# Patient Record
Sex: MALE | Race: AMERICAN INDIAN OR ALASKA NATIVE
[De-identification: names, ages, dates, MRNs, and addresses within clinical notes are randomized per-mention and may not be internally consistent; named-entity substitution may affect disease eponyms.]

---

## 2019-09-09 ENCOUNTER — HOSPITAL ENCOUNTER (EMERGENCY)
Dept: HOSPITAL 5 - ED | Age: 49
Discharge: HOME | End: 2019-09-09
Payer: COMMERCIAL

## 2019-09-09 VITALS — SYSTOLIC BLOOD PRESSURE: 167 MMHG | DIASTOLIC BLOOD PRESSURE: 102 MMHG

## 2019-09-09 DIAGNOSIS — M54.5: Primary | ICD-10-CM

## 2019-09-09 DIAGNOSIS — I10: ICD-10-CM

## 2019-09-09 PROCEDURE — 99282 EMERGENCY DEPT VISIT SF MDM: CPT

## 2019-09-09 PROCEDURE — 96372 THER/PROPH/DIAG INJ SC/IM: CPT

## 2019-09-09 NOTE — EMERGENCY DEPARTMENT REPORT
ED Back Pain/Injury HPI





- General


Chief Complaint: Back Pain/Injury


Stated Complaint: BACK PAIN


Time Seen by Provider: 09/09/19 09:52


Source: patient


Limitations: No Limitations





- History of Present Illness


Initial Comments: 





Patient is 49 years old male with history of herniated disc.  Patient presented 

to the ER complaining of lower back pain radiates down to his left leg for the 

last 3 days.  Patient denied any recent injury.  Patient also denied any fever 

or chills.  No weight loss.  No bowel or bladder incontinence.  Patient also 

denied any weakness numbness or tingling sensation.


MD Complaint: back pain


-: days(s) (5)





- Related Data


                                    Allergies











Allergy/AdvReac Type Severity Reaction Status Date / Time


 


No Known Allergies Allergy   Unverified 09/09/19 09:13














ED Review of Systems


ROS: 


Stated complaint: BACK PAIN


Other details as noted in HPI





Comment: All other systems reviewed and negative


Constitutional: denies: chills, fever


Respiratory: denies: cough, shortness of breath, SOB with exertion, wheezing


Cardiovascular: denies: chest pain, palpitations


Gastrointestinal: denies: abdominal pain


Musculoskeletal: back pain.  denies: joint swelling, arthralgia, myalgia


Neurological: denies: headache, weakness





ED Past Medical Hx





- Past Medical History


Previous Medical History?: Yes


Hx Hypertension: Yes





- Surgical History


Past Surgical History?: No





- Social History


Smoking Status: Never Smoker


Substance Use Type: None





ED Physical Exam





- General


Limitations: No Limitations


General appearance: alert, in no apparent distress





- Head


Head exam: Present: atraumatic, normocephalic, normal inspection





- Eye


Eye exam: Present: normal appearance





- ENT


ENT exam: Present: normal exam, normal orophraynx, mucous membranes moist





- Neck


Neck exam: Present: normal inspection, full ROM.  Absent: tenderness, 

meningismus, lymphadenopathy, thyromegaly





- Respiratory


Respiratory exam: Present: normal lung sounds bilaterally





- Cardiovascular


Cardiovascular Exam: Present: regular rate, normal rhythm, normal heart sounds





- GI/Abdominal


GI/Abdominal exam: Present: soft, normal bowel sounds.  Absent: distended, 

tenderness, guarding, rebound, rigid, hypoactive bowel sounds, mass, bruit, 

pulsatile mass, hernia





- Extremities Exam


Extremities exam: Present: normal inspection, full ROM, normal capillary refill.

 Absent: pedal edema, calf tenderness





- Back Exam


Back exam: Present: normal inspection, full ROM, muscle spasm.  Absent: CVA 

tenderness (R), CVA tenderness (L), paraspinal tenderness, vertebral tenderness





- Neurological Exam


Neurological exam: Present: alert, oriented X3, CN II-XII intact, normal gait, 

reflexes normal





- Psychiatric


Psychiatric exam: Present: normal mood





- Skin


Skin exam: Present: warm, intact, normal color





ED Course





                                   Vital Signs











  09/09/19





  09:15


 


Temperature 98.5 F


 


Pulse Rate 90


 


Respiratory 18





Rate 


 


Blood Pressure 167/102


 


O2 Sat by Pulse 98





Oximetry 











Critical care attestation.: 


If time is entered above; I have spent that time in minutes in the direct care 

of this critically ill patient, excluding procedure time.








ED Disposition


Clinical Impression: 


 Acute back pain





Disposition: DC-01 TO HOME OR SELFCARE


Is pt being admited?: No


Condition: Stable


Instructions:  Lumbar Radiculopathy (ED)


Referrals: 


PRIMARY CARE,MD [Referring] - 3-5 Days

## 2021-12-23 ENCOUNTER — OFFICE VISIT (OUTPATIENT)
Dept: URBAN - METROPOLITAN AREA CLINIC 118 | Facility: CLINIC | Age: 51
End: 2021-12-23
Payer: COMMERCIAL

## 2021-12-23 DIAGNOSIS — Z12.11 COLON CANCER SCREENING: ICD-10-CM

## 2021-12-23 PROCEDURE — 99202 OFFICE O/P NEW SF 15 MIN: CPT | Performed by: INTERNAL MEDICINE

## 2021-12-23 RX ORDER — NAPROXEN 500 MG/1
1 TABLET WITH FOOD OR MILK AS NEEDED TABLET ORAL
Status: ACTIVE | COMMUNITY

## 2021-12-23 RX ORDER — AMLODIPINE BESYLATE 5 MG/1
1 TABLET TABLET ORAL ONCE A DAY
Status: ACTIVE | COMMUNITY

## 2021-12-23 NOTE — HPI-TODAY'S VISIT:
The patient is referred by  for an initial colonoscopy/colon cancer screening.  He has no changes in bowel habits, rectal bleeding, abnormal loss of weight, or significant abdominal pain.  He denies a family history of colon cancer or colon polyps.  He has no active cardiopulmonary disease other than essential hypertension.

## 2021-12-28 ENCOUNTER — DASHBOARD ENCOUNTERS (OUTPATIENT)
Age: 51
End: 2021-12-28

## 2022-01-28 ENCOUNTER — OFFICE VISIT (OUTPATIENT)
Dept: URBAN - METROPOLITAN AREA SURGERY CENTER 23 | Facility: SURGERY CENTER | Age: 52
End: 2022-01-28
Payer: COMMERCIAL

## 2022-01-28 DIAGNOSIS — Z12.11 COLON CANCER SCREENING: ICD-10-CM

## 2022-01-28 PROCEDURE — G8907 PT DOC NO EVENTS ON DISCHARG: HCPCS | Performed by: INTERNAL MEDICINE

## 2022-01-28 PROCEDURE — 45378 DIAGNOSTIC COLONOSCOPY: CPT | Performed by: INTERNAL MEDICINE

## 2022-01-28 RX ORDER — AMLODIPINE BESYLATE 5 MG/1
1 TABLET TABLET ORAL ONCE A DAY
Status: ACTIVE | COMMUNITY

## 2022-01-28 RX ORDER — NAPROXEN 500 MG/1
1 TABLET WITH FOOD OR MILK AS NEEDED TABLET ORAL
Status: ACTIVE | COMMUNITY

## 2022-04-15 NOTE — SHORT STAY SUMMARY
Short Stay Documentation


Date of service: 04/15/22





- History


Principal diagnosis: umbilical hernia





- Allergies and Medications


Current Medications: 


                                    Allergies





No Known Allergies Allergy (Verified 04/04/22 16:47)


   





                                Home Medications











 Medication  Instructions  Recorded  Confirmed  Last Taken  Type


 


Naproxen [EC-Naproxen] 500 mg PO DAILY 04/04/22 04/04/22 Unknown History


 


amLODIPine [Norvasc] 5 mg PO DAILY 04/04/22 04/04/22 Unknown History








Active Medications





Cefazolin Sodium (Cefazolin/Sterile Water 2 Gm/20 Ml Syringe)  2 gm IV PREOP NR


   Stop: 04/15/22 23:59


Hydromorphone HCl (Hydromorphone 1 Mg/1 Ml Inj)  0.25 mg IV Q10MIN PRN


   PRN Reason: Pain, Moderate (4-6)


   Stop: 04/15/22 23:00


Hydromorphone HCl (Hydromorphone 1 Mg/1 Ml Inj)  0.5 mg IV Q10MIN PRN


   PRN Reason: Pain , Severe (7-10)


   Stop: 04/15/22 20:00


Lactated Ringer's (Lactated Ringers)  1,000 mls @ 125 mls/hr IV AS DIRECT GUTIERREZ


   Last Admin: 04/15/22 09:35 Dose:  125 mls/hr


   


Midazolam HCl (Midazolam 2 Mg/2 Ml Inj)  2 mg IV PREOP NR


   Stop: 04/15/22 23:59


   Last Admin: 04/15/22 11:00 Dose:  2 mg


   


Ondansetron HCl (Ondansetron 4 Mg/2 Ml Inj)  4 mg IV ONCE PRN


   PRN Reason: Nausea And Vomiting











- Brief post op/procedure progress note


Date of procedure: 04/15/22


Pre-op diagnosis: umbilical hernia


Post-op diagnosis: same


Procedure: 


robotic assisted umbilical hernia repair with mesh


Anesthesia: TISHA, other (TAP block)


Findings: 





2 cm umbilical hernia 


Repaired with 12cm x 10 cm bard flat mesh


Surgeon: RENETTA LOPEZ


Estimated blood loss: minimal


Pathology: none


Condition: stable





- Hospital course


Hospital course: 





Pt observed in PACU and discharged to home in stable condition.





- Disposition


Condition at discharge: Good





Short Stay Discharge Plan


Activity: other (No heavy lifting for 6 weeks)


Diet: regular


Wound: open to air, per your surgeon's advice


Additional Instructions: 


see printed instructions


Follow up with: 


GINO HAGEN MD [Primary Care Provider] - 7 Days


RENETTA LOPEZ DO [Staff Physician] - 14 Days


Prescriptions: 


Gabapentin 300 mg PO BID #6 cap


Ibuprofen [Motrin 800 MG tab] 800 mg PO Q8HR PRN #30 tablet


 PRN Reason: Pain, Moderate (4-6)


HYDROcodone/APAP 5-325 [[place] 5/325] 1 each PO Q6HR PRN #20 tablet


 PRN Reason: Pain , Severe (7-10)

## 2022-04-15 NOTE — ANESTHESIA CONSULTATION
Anesthesia Consult and Med Hx


Date of service: 04/15/22





- Airway


Anesthetic Teeth Evaluation: Good


ROM Head & Neck: Adequate


Mental/Hyoid Distance: Adequate


Mallampati Class: Class III


Intubation Access Assessment: Probably Good





- Pre-Operative Health Status


ASA Pre-Surgery Classification: ASA3


Proposed Anesthetic Plan: General





- Pulmonary


Hx Smoking: Yes (STOPPED X 20 YRS)


Hx Sleep Apnea: Yes (DX SLEEP APNEA , NO CPAP USE.)





- Cardiovascular System


Hx Hypertension: Yes (X 3 YRS)





- Central Nervous System


Hx Back Pain: Yes (WITH LEFT LEG PAIN)


Hx Psychiatric Problems: No





- Gastrointestinal


Hx Gastroesophageal Reflux Disease: No





- Hematic


Hx Anemia: No


Hx Sickle Cell Disease: No





- Other Systems


Hx Cancer: No

## 2022-04-15 NOTE — OPERATIVE REPORT
Operative Report


Operative Report: 


Date of procedure: 04/15/22


Pre-op diagnosis: umbilical hernia


Post-op diagnosis: same


Procedure: 


robotic assisted umbilical hernia repair with mesh


Anesthesia: GETA, other (TAP block)


Findings: 





2 cm umbilical hernia 


Repaired with 12cm x 10 cm bard flat mesh


Surgeon: RENETTA LOPEZ DO


Assistant surgeon: MD Justen


Estimated blood loss: minimal


Pathology: none


Condition: stable


Hospital course: 





Pt observed in PACU and discharged to home in stable condition.


Condition at discharge: Good





HPI and indication: 51-year-old male who presents to surgery clinic for 

evaluation of a bulge at his umbilicus.  This bulge has been present for some 

time and was reducible on exam.  He was symptomatic from the hernia.  It was 

recommended that the hernia be repaired.  All risk, benefits, alternatives 

surgery discussed with patient questions answered.  It was recommended that the 

hernia be repaired robotically.  Alternatives such as open versus laparoscopic 

repair were also discussed.  The patient was in agreement and consent obtained. 







Procedure in detail: The patient was identified in the preoperative area and 

taken back to the operating room and placed on the operating room table in 

supine position.  After anesthesia was induced, both arms were tucked with all 

bony prominences padded appropriately.  The abdomen was then prepped and draped 

in usual sterile fashion and a timeout was performed.  The patient had a TAP 

block performed by anesthesia preoperatively.  A nick incision was made in the 

left upper quadrant at Melton's point through which a Veress needle was 

inserted.  The Veress needle position was confirmed using the saline drop test 

and the abdomen insufflated to 15 mmHg without incident.  A 5 mm incision was 

made in the right upper quadrant through which a 5 mm Optiview trocar was placed

under direct visualization.  The abdomen was inspected and there was no 

underlying injury to any of the abdominal structures.  The Veress needle was 

identified and removed.  A 12 mm balloon trocar was placed in the right lateral 

abdomen and an 8 mm robotic trocar in the right lower quadrant under direct 

visualization.  The 5 mm right upper quadrant trocar was replaced with an 8 mm 

robotic trocar under direct visualization.  The patient was tilted to the left 

and the robot docked.  A fenestrated bipolar was placed in arm #2 and a 

monopolar scissor in arm #1.  The surgeon was then transferred to the console.





I createD a preperitoneal flap to the right of the hernia defect.  The 

peritoneum was scored to the right of the hernia defect using a monopolar 

scissor and a preperitoneal plane developed in an avascular plane.  Using a 

combination of blunt dissection and cautery the plane superior to and inferior 

to the hernia was developed.  There was preperitoneal fat incarcerated in the 

hernia which was carefully dissected and reduced.  I attempted to reduce the 

hernia sac however this was densely adhered to the skin.  In order to prevent 

injury to the skin the hernia sac was transected.  I then carried my 

preperitoneal dissection to the left aspect of the hernia defect in order to 

accommodate mesh placement.  Once the dissection was complete the pocket was 

checked for hemostasis.  The hernia defect was measured at 2cm and it was 

decided to fix the hernia with a 12cmx 10 cm Bard polypropylene mesh.  The mesh 

along with suture material placed into the abdomen by the assistant.  First the 

hernia defect was closed using a 0 VLoc running stitch.  The pressure in the 

abdomen was turned down to 8 mmHg.  The mesh was then placed in the 

preperitoneal space and centered.  The mesh was sutured into place in all 4 

quadrants using interrupted 2-0 Vicryl stitches.  The mesh laid flat in the 

preperitoneal space with adequate overlap of the hernia. The peritoneum was 

approximated using 3 0 VLoc running stitch.  A defect at the center of the 

peritoneum was approximated using a 3 0 V-Loc running stitch.  The robot was 

then undocked and the surgeon scrubbed back in.  The remainder of the case was 

performed laparoscopically.





All sharp and suture material was removed under direct visualization.  The 12 mm

port was removed and the fascia closed with an interrupted 0-vicryl stitch using

the [place] Brannon device..  The right lower quadrant port fascia was also 

closed with an interrupted 0 Vicryl stitch using the [place] Brannon device.  

The right upper quadrant port was removed and the abdomen desufflated.  The skin

incisions were closed with 4-0 Monocryl subcuticular stitches and skin glue.  

Once the glue was dry a 4 x 4 gauze was balled up and placed at the umbilicus 

and secured with a Tegaderm.





At the end of the case, all sponge, instrument, sharp counts were correct 2.  

An abdominal binder was applied to the patient.  The patient was awoken from 

anesthesia, extubated and taken to PACU in stable condition.